# Patient Record
Sex: MALE | Race: WHITE | Employment: FULL TIME | URBAN - METROPOLITAN AREA
[De-identification: names, ages, dates, MRNs, and addresses within clinical notes are randomized per-mention and may not be internally consistent; named-entity substitution may affect disease eponyms.]

---

## 2019-04-25 ENCOUNTER — HOSPITAL ENCOUNTER (OUTPATIENT)
Age: 44
Setting detail: SPECIMEN
Discharge: HOME | End: 2019-04-25
Payer: COMMERCIAL

## 2019-04-25 LAB — URIC ACID: 8.1 MG/DL (ref 3.4–7)

## 2022-03-30 ENCOUNTER — ANESTHESIA EVENT (OUTPATIENT)
Dept: OPERATING ROOM | Age: 47
End: 2022-03-30
Payer: COMMERCIAL

## 2022-03-31 ENCOUNTER — HOSPITAL ENCOUNTER (OUTPATIENT)
Age: 47
Setting detail: OUTPATIENT SURGERY
Discharge: HOME | End: 2022-03-31
Attending: INTERNAL MEDICINE | Admitting: INTERNAL MEDICINE
Payer: COMMERCIAL

## 2022-03-31 ENCOUNTER — ANESTHESIA (OUTPATIENT)
Dept: OPERATING ROOM | Age: 47
End: 2022-03-31
Payer: COMMERCIAL

## 2022-03-31 VITALS
DIASTOLIC BLOOD PRESSURE: 59 MMHG | RESPIRATION RATE: 13 BRPM | OXYGEN SATURATION: 99 % | SYSTOLIC BLOOD PRESSURE: 103 MMHG

## 2022-03-31 VITALS
SYSTOLIC BLOOD PRESSURE: 118 MMHG | BODY MASS INDEX: 28.44 KG/M2 | WEIGHT: 210 LBS | RESPIRATION RATE: 16 BRPM | TEMPERATURE: 97.9 F | DIASTOLIC BLOOD PRESSURE: 88 MMHG | HEIGHT: 72 IN | HEART RATE: 78 BPM | OXYGEN SATURATION: 97 %

## 2022-03-31 PROCEDURE — 2709999900 HC NON-CHARGEABLE SUPPLY: Performed by: INTERNAL MEDICINE

## 2022-03-31 PROCEDURE — 88305 TISSUE EXAM BY PATHOLOGIST: CPT

## 2022-03-31 PROCEDURE — 3700000000 HC ANESTHESIA ATTENDED CARE: Performed by: INTERNAL MEDICINE

## 2022-03-31 PROCEDURE — 3609027000 HC COLONOSCOPY: Performed by: INTERNAL MEDICINE

## 2022-03-31 PROCEDURE — 3700000001 HC ADD 15 MINUTES (ANESTHESIA): Performed by: INTERNAL MEDICINE

## 2022-03-31 PROCEDURE — 7100000010 HC PHASE II RECOVERY - FIRST 15 MIN: Performed by: INTERNAL MEDICINE

## 2022-03-31 PROCEDURE — 6360000002 HC RX W HCPCS: Performed by: NURSE ANESTHETIST, CERTIFIED REGISTERED

## 2022-03-31 PROCEDURE — 2580000003 HC RX 258: Performed by: SPECIALIST

## 2022-03-31 PROCEDURE — 2500000003 HC RX 250 WO HCPCS: Performed by: NURSE ANESTHETIST, CERTIFIED REGISTERED

## 2022-03-31 PROCEDURE — 3609017100 HC EGD: Performed by: INTERNAL MEDICINE

## 2022-03-31 PROCEDURE — 7100000011 HC PHASE II RECOVERY - ADDTL 15 MIN: Performed by: INTERNAL MEDICINE

## 2022-03-31 RX ORDER — SODIUM CHLORIDE, SODIUM LACTATE, POTASSIUM CHLORIDE, CALCIUM CHLORIDE 600; 310; 30; 20 MG/100ML; MG/100ML; MG/100ML; MG/100ML
INJECTION, SOLUTION INTRAVENOUS CONTINUOUS PRN
Status: DISCONTINUED | OUTPATIENT
Start: 2022-03-31 | End: 2022-03-31 | Stop reason: SDUPTHER

## 2022-03-31 RX ORDER — OMEPRAZOLE 40 MG/1
40 CAPSULE, DELAYED RELEASE ORAL DAILY
COMMUNITY
Start: 2022-03-28

## 2022-03-31 RX ORDER — LIDOCAINE HYDROCHLORIDE 20 MG/ML
INJECTION, SOLUTION EPIDURAL; INFILTRATION; INTRACAUDAL; PERINEURAL PRN
Status: DISCONTINUED | OUTPATIENT
Start: 2022-03-31 | End: 2022-03-31 | Stop reason: SDUPTHER

## 2022-03-31 RX ORDER — SODIUM CHLORIDE, SODIUM LACTATE, POTASSIUM CHLORIDE, CALCIUM CHLORIDE 600; 310; 30; 20 MG/100ML; MG/100ML; MG/100ML; MG/100ML
INJECTION, SOLUTION INTRAVENOUS CONTINUOUS
Status: DISCONTINUED | OUTPATIENT
Start: 2022-03-31 | End: 2022-03-31 | Stop reason: HOSPADM

## 2022-03-31 RX ORDER — LIDOCAINE HYDROCHLORIDE 10 MG/ML
1 INJECTION, SOLUTION EPIDURAL; INFILTRATION; INTRACAUDAL; PERINEURAL
Status: DISCONTINUED | OUTPATIENT
Start: 2022-03-31 | End: 2022-03-31 | Stop reason: HOSPADM

## 2022-03-31 RX ORDER — SODIUM CHLORIDE 0.9 % (FLUSH) 0.9 %
10 SYRINGE (ML) INJECTION PRN
Status: DISCONTINUED | OUTPATIENT
Start: 2022-03-31 | End: 2022-03-31 | Stop reason: HOSPADM

## 2022-03-31 RX ORDER — PROPOFOL 10 MG/ML
INJECTION, EMULSION INTRAVENOUS PRN
Status: DISCONTINUED | OUTPATIENT
Start: 2022-03-31 | End: 2022-03-31 | Stop reason: SDUPTHER

## 2022-03-31 RX ORDER — ALLOPURINOL 300 MG/1
300 TABLET ORAL DAILY
COMMUNITY
Start: 2022-03-02

## 2022-03-31 RX ORDER — SODIUM CHLORIDE 0.9 % (FLUSH) 0.9 %
10 SYRINGE (ML) INJECTION EVERY 12 HOURS SCHEDULED
Status: DISCONTINUED | OUTPATIENT
Start: 2022-03-31 | End: 2022-03-31 | Stop reason: HOSPADM

## 2022-03-31 RX ORDER — SODIUM CHLORIDE 9 MG/ML
25 INJECTION, SOLUTION INTRAVENOUS PRN
Status: DISCONTINUED | OUTPATIENT
Start: 2022-03-31 | End: 2022-03-31 | Stop reason: HOSPADM

## 2022-03-31 RX ORDER — FENTANYL CITRATE 50 UG/ML
INJECTION, SOLUTION INTRAMUSCULAR; INTRAVENOUS PRN
Status: DISCONTINUED | OUTPATIENT
Start: 2022-03-31 | End: 2022-03-31 | Stop reason: SDUPTHER

## 2022-03-31 RX ORDER — SODIUM CHLORIDE 9 MG/ML
INJECTION, SOLUTION INTRAVENOUS CONTINUOUS
Status: DISCONTINUED | OUTPATIENT
Start: 2022-03-31 | End: 2022-03-31 | Stop reason: HOSPADM

## 2022-03-31 RX ADMIN — PROPOFOL 30 MG: 10 INJECTION, EMULSION INTRAVENOUS at 15:53

## 2022-03-31 RX ADMIN — PROPOFOL 30 MG: 10 INJECTION, EMULSION INTRAVENOUS at 16:00

## 2022-03-31 RX ADMIN — PROPOFOL 30 MG: 10 INJECTION, EMULSION INTRAVENOUS at 15:58

## 2022-03-31 RX ADMIN — PROPOFOL 30 MG: 10 INJECTION, EMULSION INTRAVENOUS at 16:04

## 2022-03-31 RX ADMIN — PROPOFOL 30 MG: 10 INJECTION, EMULSION INTRAVENOUS at 15:55

## 2022-03-31 RX ADMIN — Medication 50 MCG: at 15:47

## 2022-03-31 RX ADMIN — PROPOFOL 40 MG: 10 INJECTION, EMULSION INTRAVENOUS at 16:11

## 2022-03-31 RX ADMIN — PROPOFOL 50 MG: 10 INJECTION, EMULSION INTRAVENOUS at 15:52

## 2022-03-31 RX ADMIN — Medication 50 MCG: at 15:50

## 2022-03-31 RX ADMIN — SODIUM CHLORIDE, POTASSIUM CHLORIDE, SODIUM LACTATE AND CALCIUM CHLORIDE: 600; 310; 30; 20 INJECTION, SOLUTION INTRAVENOUS at 15:10

## 2022-03-31 RX ADMIN — PROPOFOL 30 MG: 10 INJECTION, EMULSION INTRAVENOUS at 16:07

## 2022-03-31 RX ADMIN — PROPOFOL 30 MG: 10 INJECTION, EMULSION INTRAVENOUS at 16:02

## 2022-03-31 RX ADMIN — PROPOFOL 50 MG: 10 INJECTION, EMULSION INTRAVENOUS at 15:51

## 2022-03-31 RX ADMIN — LIDOCAINE HYDROCHLORIDE 40 MG: 20 INJECTION, SOLUTION EPIDURAL; INFILTRATION; INTRACAUDAL; PERINEURAL at 15:50

## 2022-03-31 ASSESSMENT — PULMONARY FUNCTION TESTS
PIF_VALUE: 0
PIF_VALUE: 1
PIF_VALUE: 0
PIF_VALUE: 1
PIF_VALUE: 0
PIF_VALUE: 1

## 2022-03-31 ASSESSMENT — PAIN SCALES - GENERAL
PAINLEVEL_OUTOF10: 0

## 2022-03-31 ASSESSMENT — PAIN - FUNCTIONAL ASSESSMENT: PAIN_FUNCTIONAL_ASSESSMENT: 0-10

## 2022-03-31 NOTE — ANESTHESIA POSTPROCEDURE EVALUATION
Department of Anesthesiology  Postprocedure Note    Patient: Faiza Quinn  MRN: [id number]  YOB: 1975  Date of evaluation: 3/31/2022  Time:  4:41 PM     Procedure Summary     Date: 03/31/22 Room / Location: [address]    Anesthesia Start: 2422 Anesthesia Stop: 1619    Procedures:       COLONOSCOPY DIAGNOSTIC (N/A Anus)      EGD ESOPHAGOGASTRODUODENOSCOPY (N/A Esophagus) Diagnosis: (DX SCREENING        GALLEGO'S ESOPHAGUS)    Surgeons: Selene Castrejon MD Responsible Provider: Marlis Phalen, MD    Anesthesia Type: MAC, general ASA Status: 2          Anesthesia Type: MAC, general    Flynn Phase I:      Flynn Phase II: Flynn Score: 8    Last vitals: Reviewed and per EMR flowsheets.        Anesthesia Post Evaluation    Complications: no

## 2022-03-31 NOTE — OP NOTE
Operative Note      Patient: Maribel Young  YOB: 1975  MRN: [id number]    Date of Procedure: 3/31/2022    Pre-Op Diagnosis: DX SCREENING            Indication for the procedure: Patient is a pleasant 52years old male who is presenting for his first screening colonoscopy. Colonoscopy is being done for further evaluation. Post-Op Diagnosis: Terminal ileum ulcer: Internal hemorrhoids: Sigmoid diverticulosis and colon polyp. Procedure(s):  COLONOSCOPY DIAGNOSTIC  EGD ESOPHAGOGASTRODUODENOSCOPY    Surgeon(s): Mariangel Mtz MD    Assistant:   * No surgical staff found *    Informed consent: Risks, benefits, and alternatives of the procedure were explained to the patient prior to the procedure. Risks include but not limited to bleeding, perforation, aspiration, allergic reaction to medication or death. Patient was also informed about the risk of missing a lesion. Anesthesia: Monitor Anesthesia Care    Estimated Blood Loss (mL): Minimal    Complications: None    Specimens:   ID Type Source Tests Collected by Time Destination   A : RULE OUT CELIAC DISEASE Tissue Duodenum SURGICAL PATHOLOGY Mariangel Mtz MD 3/31/2022 1553    B : Seth Garcias MD 3/31/2022 1553    C : ESOPHAGUS DISTAL HX OF BARRETTS Tissue Esophagus SURGICAL PATHOLOGY Mariangel Mtz MD 3/31/2022 1558    D : ESOPHAGUS PROXIMAL HX EOSINOPHILIC ESOPHAGITIS Tissue Esophagus SURGICAL PATHOLOGY Mariangel Mtz MD 3/31/2022 1559    E : ILEUM ULCER Tissue Colon SURGICAL PATHOLOGY Mariangel Mtz MD 3/31/2022 1553    F : Karthikeyan Alvarez MD 3/31/2022 1608    G : RECTUM POLYP Tissue Rectum SURGICAL PATHOLOGY Mariangel Mtz MD 3/31/2022 1612        Implants:  * No implants in log *      Drains: * No LDAs found *    Findings: 1-internal hemorrhoids  2-terminal ileum ulceration  3-sigmoid diverticulosis  4-4 mm polyp in the rectum that was removed by biopsy. Questions patient/    Detailed Description of Procedure: The patient was placed in the left lateral decubitus position. Initially digital rectal exam was performed. No mass was appreciated. The Olympus Olympus colonoscope was passed through the rectum up to the cecum. The terminal ileum was intubated. There was a 6 mm clean-based ulceration in the terminal ileum. Biopsies were taken. The scope was then brought back into the colon. Colon mucosa appeared normal.  Biopsies were taken. Cecal mucosa, ascending colon mucosa, transverse colon and descending colon mucosa appeared normal.  There was sigmoid diverticulosis seen. In the rectum there was a 4 mm hyperplastic polyp that was removed with biopsy. Retroflexion was done. Internal hemorrhoids were seen. The scope was then removed outside the patient. Plan: 1. Follow up on results of pathology  2. Follow-up in the office in 2 weeks.

## 2022-03-31 NOTE — ANESTHESIA PRE PROCEDURE
Department of Anesthesiology  Preprocedure Note       Name:  Ana Hardy   Age:  52 y.o.  :  1975                                          MRN:  [id number]         Date:  3/31/2022      Surgeon: Jammie Marley): Apolonia Gordon MD    Procedure: Procedure(s):  COLONOSCOPY DIAGNOSTIC  EGD ESOPHAGOGASTRODUODENOSCOPY    Medications prior to admission:   Prior to Admission medications    Not on File       Current medications:    Current Facility-Administered Medications   Medication Dose Route Frequency Provider Last Rate Last Admin    lidocaine PF 1 % injection 1 mL  1 mL IntraDERmal Once PRN Fabiola Howard MD        0.9 % sodium chloride infusion   IntraVENous Continuous Ndal Eva Leslie MD        lactated ringers infusion   IntraVENous Continuous Ndal Eva Leslie MD        sodium chloride flush 0.9 % injection 10 mL  10 mL IntraVENous 2 times per day Fabiola Howard MD        sodium chloride flush 0.9 % injection 10 mL  10 mL IntraVENous PRN Fabiola Howard MD        0.9 % sodium chloride infusion  25 mL IntraVENous PRN Fabiola Howard MD           Allergies:  No Known Allergies    Problem List:  There is no problem list on file for this patient. Past Medical History:  No past medical history on file. Past Surgical History:  No past surgical history on file.     Social History:    Social History     Tobacco Use    Smoking status: Not on file    Smokeless tobacco: Not on file   Substance Use Topics    Alcohol use: Not on file                                Counseling given: Not Answered      Vital Signs (Current):   Vitals:    22 1417 22 1426   BP: (!) 143/93    Pulse: 89    Resp: 16    Temp: 97.7 °F (36.5 °C)    TempSrc: Temporal    SpO2: 99%    Weight:  210 lb (95.3 kg)   Height:  6' (1.829 m)                                              BP Readings from Last 3 Encounters:   22 (!) 143/93       NPO Status:                                                                                 BMI:   Wt Readings from Last 3 Encounters:   03/31/22 210 lb (95.3 kg)     Body mass index is 28.48 kg/m². CBC: No results found for: WBC, RBC, HGB, HCT, MCV, RDW, PLT    CMP: No results found for: NA, K, CL, CO2, BUN, CREATININE, GFRAA, AGRATIO, LABGLOM, GLUCOSE, GLU, PROT, CALCIUM, BILITOT, ALKPHOS, AST, ALT    POC Tests: No results for input(s): POCGLU, POCNA, POCK, POCCL, POCBUN, POCHEMO, POCHCT in the last 72 hours. Coags: No results found for: PROTIME, INR, APTT    HCG (If Applicable): No results found for: PREGTESTUR, PREGSERUM, HCG, HCGQUANT     ABGs: No results found for: PHART, PO2ART, ZXW9OFO, VTA1HKO, BEART, [id number]     Type & Screen (If Applicable):  No results found for: LABABO, LABRH    Drug/Infectious Status (If Applicable):  No results found for: HIV, HEPCAB    COVID-19 Screening (If Applicable): No results found for: COVID19        Anesthesia Evaluation  Patient summary reviewed and Nursing notes reviewed no history of anesthetic complications:   Airway: Mallampati: II  TM distance: >3 FB   Neck ROM: full  Mouth opening: > = 3 FB Dental: normal exam         Pulmonary:normal exam        (-) COPD and asthma                           Cardiovascular:  Exercise tolerance: good (>4 METS),       (-) past MI and CAD        Rate: normal                    Neuro/Psych:      (-) TIA and CVA           GI/Hepatic/Renal:        (-) GERD       Endo/Other:        (-) diabetes mellitus               Abdominal:             Vascular: Other Findings:             Anesthesia Plan      MAC and general     ASA 2       Induction: intravenous. MIPS: Prophylactic antiemetics administered. Anesthetic plan and risks discussed with patient. Plan discussed with CRNA.     Attending anesthesiologist reviewed and agrees with Preprocedure content              Amanda Denney DO   3/31/2022

## 2022-03-31 NOTE — OP NOTE
Operative Note      Patient: Guru Chamorro  YOB: 1975  MRN: [id number]    Date of Procedure: 3/31/2022    Pre-Op Diagnosis:  GALLEGO'S ESOPHAGUS    Indication for the procedure: Patient is a pleasant 52years old male who was seen with history of Gallego's esophagus as well as eosinophilic esophagitis. He is scheduled for surveillance EGD. Post-Op Diagnosis: Irregular Z-line. Otherwise normal EGD. Procedure(s):  COLONOSCOPY DIAGNOSTIC  EGD ESOPHAGOGASTRODUODENOSCOPY    Surgeon(s): Nasim Trevizo MD    Assistant:   * No surgical staff found *    Informed consent: Risks, benefits, and alternatives of the procedure were explained to the patient prior to the procedure. Risks include but not limited to bleeding, perforation, aspiration, allergic reaction to medication or death. Patient was also informed about the risk of missing a lesion. Anesthesia: Monitor Anesthesia Care    Estimated Blood Loss (mL): Minimal    Complications: None    Specimens:   ID Type Source Tests Collected by Time Destination   A : RULE OUT CELIAC DISEASE Tissue Duodenum SURGICAL PATHOLOGY Nasim Trevizo MD 3/31/2022 1553    B : Seth Garcias MD 3/31/2022 1553    C : ESOPHAGUS DISTAL HX OF BARRETTS Tissue Esophagus SURGICAL PATHOLOGY Nasim Trevizo MD 3/31/2022 1558    D : ESOPHAGUS PROXIMAL HX EOSINOPHILIC ESOPHAGITIS Tissue Esophagus SURGICAL PATHOLOGY Nasim Trevizo MD 3/31/2022 1559    E : ILEUM ULCER Tissue Colon SURGICAL PATHOLOGY Nasim Trevizo MD 3/31/2022 1553    F : RANDOM COLON Tissue Colon SURGICAL PATHOLOGY Nasim Trevizo MD 3/31/2022 1608    G : RECTUM POLYP Tissue Rectum SURGICAL PATHOLOGY Nasim Trevizo MD 3/31/2022 1612        Implants:  * No implants in log *      Drains: * No LDAs found *    Findings: Irregular Z-line. Biopsies were taken to evaluate for Gallego's esophagus.     Detailed Description of Procedure:   Patient was placed in the left lateral decubitus position. The well-lubricated Olympus EGD scope was passed through the bite-block was advanced into the esophagus. Esophageal mucosa of the upper middle esophagus appeared normal.  Biopsies were taken. The scope was advanced into the distal esophagus. The Z-line was irregular. Biopsies were taken for Pond's esophagus. The scope was then advanced into the stomach. Gastric body mucosa appeared normal.  Biopsies were taken from the antrum and gastric body throughout H. pylori. Retroflexion was done. The cardia fundus stomach were seen appeared normal.  The scope was then brought back into forward view. Pylorus was intubated. Duodenal bulb and second portion appeared normal.  Biopsies were taken to rule out celiac disease. The scope was then removed outside the patient. Plan: 1. Follow up on results of pathology  2-follow-up in the office in 2 weeks.     Electronically signed by Niyah Scott MD on 3/31/2022 at 4:14 PM

## 2022-03-31 NOTE — H&P
Interval H&P Note    Pt Name: Randa Walters  MRN: [id number]  YOB: 1975  Date of evaluation: 3/31/2022      [x] I have reviewed the hard copy gastroenterology progress note by Smooth TYLER dated 3/28/2022 attached below which meets the criteria for an Interval History and Physical note. [x] I have examined  Randa Walters  There are no changes to the patient who is scheduled for a diagnostic colonoscopy and EGD by Dr. Molly Arias for screening, Pond's esophagus. The patient denies new health changes, fever, chills, wheezing, cough, increased SOB, chest pain, open sores or wounds. Denies hx diabetes. Denies taking any blood thinning medications in the last 10 days. Vital signs: BP (!) 143/93   Pulse 89   Temp 97.7 °F (36.5 °C) (Temporal)   Resp 16   Ht 6' (1.829 m)   Wt 210 lb (95.3 kg)   SpO2 99%   BMI 28.48 kg/m²     Allergies:  Penicillins and Erythromycin    Medications:    Prior to Admission medications    Medication Sig Start Date End Date Taking?  Authorizing Provider   allopurinol (ZYLOPRIM) 300 MG tablet Take 300 mg by mouth daily  3/2/22   Historical Provider, MD   omeprazole (PRILOSEC) 40 MG delayed release capsule Take 40 mg by mouth daily  3/28/22   Historical Provider, MD         Past Medical History:     Past Medical History:   Diagnosis Date    GERD (gastroesophageal reflux disease)     Gout         Past Surgical History:     Past Surgical History:   Procedure Laterality Date    COLONOSCOPY      ENDOSCOPY, COLON, DIAGNOSTIC      FINGER SURGERY      pins and screws middle finger right hand     HERNIA REPAIR      NASAL SEPTUM SURGERY      SHOULDER ARTHROSCOPY         Social History:     Social History     Socioeconomic History    Marital status:      Spouse name: None    Number of children: None    Years of education: None    Highest education level: None   Occupational History    None   Tobacco Use    Smoking status: Never Smoker    Smokeless tobacco: Current User     Types: Chew Vaping Use    Vaping Use: Never used   Substance and Sexual Activity    Alcohol use: Yes     Comment: occasionally     Drug use: Never    Sexual activity: None   Other Topics Concern    None   Social History Narrative    None     Social Determinants of Health     Financial Resource Strain:     Difficulty of Paying Living Expenses: Not on file   Food Insecurity:     Worried About [address] in the Last Year: Not on file    Twila of Food in the Last Year: Not on file   Transportation Needs:     Lack of Transportation (Medical): Not on file    Lack of Transportation (Non-Medical): Not on file   Physical Activity:     Days of Exercise per Week: Not on file    Minutes of Exercise per Session: Not on file   Stress:     Feeling of Stress : Not on file   Social Connections:     Frequency of Communication with Friends and Family: Not on file    Frequency of Social Gatherings with Friends and Family: Not on file    Attends [employer] Services: Not on file    Active Member of Clubs or Organizations: Not on file    Attends Club or Organization Meetings: Not on file    Marital Status: Not on file   Intimate Partner Violence:     Fear of Current or Ex-Partner: Not on file    Emotionally Abused: Not on file    Physically Abused: Not on file    Sexually Abused: Not on file   Housing Stability:     Unable to Pay for Housing in the Last Year: Not on file    Number of [place] in the Last Year: Not on file    Unstable Housing in the Last Year: Not on file       Family History:     History reviewed. No pertinent family history. This is a 52 y.o. male who is pleasant, cooperative, alert and oriented x3, in no acute distress. Heart: Heart sounds are normal.  HR 89 regular rate and rhythm without murmur, gallop or rub.    Lungs: Normal respiratory effort with equal expansion, good air exchange, unlabored and clear to auscultation without wheezes or rales bilaterally   Abdomen: soft, nontender, nondistended with bowel sounds active. Labs:  No results for input(s): HGB, HCT, WBC, MCV, PLT, NA, K, CL, CO2, BUN, CREATININE, GLUCOSE, INR, PROTIME, APTT, AST, ALT, LABALBU, HCG in the last 720 hours. No results for input(s): COVID19 in the last 720 hours.     ADIS Del Castillo CNP  Electronically signed 3/31/2022 at 2:52 PM

## 2022-04-04 LAB — SURGICAL PATHOLOGY REPORT: NORMAL

## (undated) DEVICE — CONMED DISPOSABLE GASTROINTESTINAL CYTOLOGY BRUSH, STRAIGHT HANDLE, 2.5 MM X 160 CM: Brand: CONMED

## (undated) DEVICE — BLOCK BITE 60FR RUBBER ADLT DENTAL

## (undated) DEVICE — CO2 CANNULA,SUPERSOFT, ADLT,7'O2,7'CO2: Brand: MEDLINE

## (undated) DEVICE — ELECTRODE PT RET AD L9FT HI MOIST COND ADH HYDRGEL CORDED

## (undated) DEVICE — GAUZE,SPONGE,4"X4",16PLY,STRL,LF,10/TRAY: Brand: MEDLINE

## (undated) DEVICE — GOWN,AURORA,NONREINFORCED,LARGE: Brand: MEDLINE

## (undated) DEVICE — YANKAUER,FLEXIBLE HANDLE,REGLR CAPACITY: Brand: MEDLINE INDUSTRIES, INC.

## (undated) DEVICE — MEDICINE CUP, GRADUATED, STER: Brand: MEDLINE

## (undated) DEVICE — BASIN EMSIS 700ML GRAPHITE PLAS KID SHP GRAD

## (undated) DEVICE — Device: Brand: DEFENDO VALVE AND CONNECTOR KIT

## (undated) DEVICE — TUBING, SUCTION, 1/4" X 12', STRAIGHT: Brand: MEDLINE

## (undated) DEVICE — GLOVE SURG 8 11.7IN BEAD CUF LIGHT BRN SENSICARE LTX FREE

## (undated) DEVICE — JELLY,LUBE,STERILE,FLIP TOP,TUBE,2-OZ: Brand: MEDLINE

## (undated) DEVICE — FORCEPS BX L240CM WRK CHN 2.8MM STD CAP W/ NDL MIC MESH

## (undated) DEVICE — SYRINGE MED 50ML LUERLOCK TIP

## (undated) DEVICE — TRAP SURG QUAD PARABOLA SLOT DSGN SFTY SCRN TRAPEASE

## (undated) DEVICE — CUP MED 1OZ CLR POLYPR FEED GRAD W/O LID

## (undated) DEVICE — ADAPTER TBNG LUER STUB 15 GA INTMED